# Patient Record
(demographics unavailable — no encounter records)

---

## 2024-12-03 NOTE — DISCUSSION/SUMMARY
[Normal Growth] : growth [Normal Development] : development  [No Elimination Concerns] : elimination [Continue Regimen] : feeding [No Skin Concerns] : skin [Normal Sleep Pattern] : sleep [None] : no medical problems [Anticipatory Guidance Given] : Anticipatory guidance addressed as per the history of present illness section [No Vaccines] : no vaccines needed [No Medications] : ~He/She~ is not on any medications [FreeTextEntry1] : 4yo M w developmental delays, dental caries presenting for HCM. Growth normal but noted to have developmental delays in all categories, he receives ST/OT/PT at school, however, advised DBP referral. DBP appointment scheduled in Feb 2025, previously missed visits. PE largely unremarkable, notable for cerumen in ears and cavities. Immunizations UTD, due for flu.   PLAN - Routine care & anticipatory guidance given - CBC and lead reviewed from December 2023, WNL - Flu Vaccine - Post vaccine care discussed & potential side effects reviewed - Establish care with DBP in 2/2025. Emphasized importance of meeting.  - F/u with Audiology. Recommended routine screening.  - Referrals: Dental, Ophtho - RTC for 6 year old HCM and prn  Caretaker expressed understanding of the plan and agrees. All questions were answered.

## 2024-12-03 NOTE — DEVELOPMENTAL MILESTONES
[Is dry through the day] :  is dry through the day [Plays and interacts with peer] : plays and interacts with peer [Counts 5 objects] : counts 5 objects [Names 3 or more numbers] : names 3 or more numbers [Names 4 or more letters out of order] : names 4 or more letters out of order [Catches a bounced ball with] : catches a bounced ball with 2 hands [Copies first name] : copies first name [Cuts well with scissors] : cuts well with scissors [Writes 2 or more letters] : writes 2 or more letters [Spreads with a knife] : does not spread with a knife [Dresses and undresses without help] : does not dress and undress without help [Goes to the bathroom independently] : does not go to bathroom independently [Answers "why" questions] : does not answer "why" questions [Tells a story of 2 sentences or more] : does not tell a story of 2 sentences or more [Follows directions for 4 individual] : does not follow directions for 4 individual prepositions [Is beginning to skip] : is not beginning to skip [Walks on tiptoes when asked] : does not walk on tiptoes when asked [Copies a triangle] : does not copy a triangle [Draws a 6-part person] : does not draw a 6-part person [FreeTextEntry1] : Receives ST/OT/PT

## 2024-12-03 NOTE — HISTORY OF PRESENT ILLNESS
[Mother] : mother [whole ___ oz/d] : consumes [unfilled] oz of whole cow's milk per day [Fruit] : fruit [Vegetables] : vegetables [Meat] : meat [Grains] : grains [Dairy] : dairy [Normal] : Normal [Brushing teeth] : Brushing teeth [Playtime (60 min/d)] : Playtime 60 min a day [< 2 hrs of screen time] : Less than 2 hrs of screen time [In ] : In  [Special Education] : receives special education  [No] : No cigarette smoke exposure [Up to date] : Up to date [NO] : No [Exposure to electronic nicotine delivery system] : No exposure to electronic nicotine delivery system [de-identified] : minimal milk [FreeTextEntry1] : 6yo M w hx developmental delays here for C. No concerns. Has not been to dentist. Has not been to developmental appointments for developmental delays. Mother reports he is in  and can read now..

## 2024-12-03 NOTE — PHYSICAL EXAM
[Alert] : alert [No Acute Distress] : no acute distress [Playful] : playful [Normocephalic] : normocephalic [Conjunctivae with no discharge] : conjunctivae with no discharge [PERRL] : PERRL [EOMI Bilateral] : EOMI bilateral [Auricles Well Formed] : auricles well formed [Clear Tympanic membranes with present light reflex and bony landmarks] : clear tympanic membranes with present light reflex and bony landmarks [No Discharge] : no discharge [Nares Patent] : nares patent [Pink Nasal Mucosa] : pink nasal mucosa [Palate Intact] : palate intact [Uvula Midline] : uvula midline [Nonerythematous Oropharynx] : nonerythematous oropharynx [Trachea Midline] : trachea midline [Supple, full passive range of motion] : supple, full passive range of motion [No Palpable Masses] : no palpable masses [Symmetric Chest Rise] : symmetric chest rise [Clear to Auscultation Bilaterally] : clear to auscultation bilaterally [Normoactive Precordium] : normoactive precordium [Regular Rate and Rhythm] : regular rate and rhythm [Normal S1, S2 present] : normal S1, S2 present [No Murmurs] : no murmurs [+2 Femoral Pulses] : +2 femoral pulses [Soft] : soft [NonTender] : non tender [Non Distended] : non distended [Normoactive Bowel Sounds] : normoactive bowel sounds [No Hepatomegaly] : no hepatomegaly [No Splenomegaly] : no splenomegaly [Symmetric Hip Rotation] : symmetric hip rotation [No Gait Asymmetry] : no gait asymmetry [Normal Muscle Tone] : normal muscle tone [Crying] : crying [No Rash or Lesions] : no rash or lesions [Ceasar 1] : Ceasar 1 [Uncircumcised] : uncircumcised [Testicles Descended Bilaterally] : testicles descended bilaterally [FreeTextEntry1] : Hyperactive, limited cooperation [FreeTextEntry3] : cerumen noted in ears b/l [de-identified] : +dental caries

## 2024-12-03 NOTE — ADDENDUM
[FreeTextEntry1] : SDOH Screening Questionnaire   SDOH (Social Determinants of Health) Questionnaire: 1. Housing: Do you worry that in the upcoming months, your family, or child, may not have a safe or stable place to live? no 2. Food security: Within the last 12 months, did the food you bought not last and you did not have money to buy more? no 3. Community: Do you need help getting public benefits like food stamps or WIC? no 4. Transportation: Does your child have chronic medical condition and therefore struggle with transportation to attend medical appointments? no  5. Healthcare Access: Do you need help getting health or dental insurance? no       Result: Negative Screen. No further intervention needed.

## 2025-02-18 NOTE — REASON FOR VISIT
[Initial Consultation] : an initial consultation for [Recommendation for Intervention] : recommendation for intervention [Speech/Language] : speech/language [Diagnostic Clarification] : diagnostic clarification [Mother] : mother [] :  [FreeTextEntry4] :  NONE